# Patient Record
Sex: MALE | Race: ASIAN | NOT HISPANIC OR LATINO | Employment: STUDENT | URBAN - METROPOLITAN AREA
[De-identification: names, ages, dates, MRNs, and addresses within clinical notes are randomized per-mention and may not be internally consistent; named-entity substitution may affect disease eponyms.]

---

## 2022-08-12 ENCOUNTER — HOSPITAL ENCOUNTER (EMERGENCY)
Facility: CLINIC | Age: 27
Discharge: HOME | End: 2022-08-12
Attending: INTERNAL MEDICINE | Admitting: INTERNAL MEDICINE
Payer: COMMERCIAL

## 2022-08-12 VITALS
TEMPERATURE: 98.6 F | RESPIRATION RATE: 24 BRPM | DIASTOLIC BLOOD PRESSURE: 94 MMHG | SYSTOLIC BLOOD PRESSURE: 148 MMHG | OXYGEN SATURATION: 97 % | HEART RATE: 103 BPM | WEIGHT: 255.73 LBS

## 2022-08-12 DIAGNOSIS — R06.02 SOB (SHORTNESS OF BREATH): ICD-10-CM

## 2022-08-12 PROCEDURE — 250N000013 HC RX MED GY IP 250 OP 250 PS 637: Performed by: INTERNAL MEDICINE

## 2022-08-12 PROCEDURE — 99283 EMERGENCY DEPT VISIT LOW MDM: CPT | Performed by: INTERNAL MEDICINE

## 2022-08-12 RX ORDER — DIPHENHYDRAMINE HCL 25 MG
25 CAPSULE ORAL ONCE
Status: COMPLETED | OUTPATIENT
Start: 2022-08-12 | End: 2022-08-12

## 2022-08-12 RX ORDER — ALBUTEROL SULFATE 90 UG/1
2 AEROSOL, METERED RESPIRATORY (INHALATION) EVERY 6 HOURS PRN
Qty: 18 G | Refills: 0 | Status: SHIPPED | OUTPATIENT
Start: 2022-08-12

## 2022-08-12 RX ORDER — CETIRIZINE HYDROCHLORIDE 10 MG/1
10 TABLET ORAL DAILY
Qty: 30 TABLET | Refills: 0 | Status: SHIPPED | OUTPATIENT
Start: 2022-08-12

## 2022-08-12 RX ADMIN — DIPHENHYDRAMINE HYDROCHLORIDE 25 MG: 25 CAPSULE ORAL at 17:47

## 2022-08-12 ASSESSMENT — ENCOUNTER SYMPTOMS
VOMITING: 0
RHINORRHEA: 1
PALPITATIONS: 0
CHEST TIGHTNESS: 0
CHILLS: 0
ABDOMINAL PAIN: 0
NAUSEA: 0
BACK PAIN: 0
LIGHT-HEADEDNESS: 0
WHEEZING: 1
TROUBLE SWALLOWING: 0
SHORTNESS OF BREATH: 1
NUMBNESS: 0
ADENOPATHY: 0
DYSPHORIC MOOD: 0
VOICE CHANGE: 0
DIARRHEA: 0
DYSURIA: 0
WEAKNESS: 0
SORE THROAT: 0
NERVOUS/ANXIOUS: 1
COUGH: 1
FEVER: 0
HEADACHES: 0

## 2022-08-12 ASSESSMENT — ACTIVITIES OF DAILY LIVING (ADL): ADLS_ACUITY_SCORE: 33

## 2022-08-12 NOTE — ED TRIAGE NOTES
Pt not sure if he is having a allergic reaction or not. Pt's roommate made shrimp last night, pt did not eat it but worried about it. Per pt he is having shortness of breath and chest pressure but did state that he gets more anxious, pt did work out more last night and some of this started after that also. On arrival pt's VSS. Pt talking in full sentences without issues.       Triage Assessment     Row Name 08/12/22 [employer]       Triage Assessment (Adult)    Airway WDL WDL       Respiratory WDL    Respiratory WDL WDL       Skin Circulation/Temperature WDL    Skin Circulation/Temperature WDL WDL       Cardiac WDL    Cardiac WDL WDL       Peripheral/Neurovascular WDL    Peripheral Neurovascular WDL WDL       Cognitive/Neuro/Behavioral WDL    Cognitive/Neuro/Behavioral WDL WDL

## 2022-08-12 NOTE — ED PROVIDER NOTES
ED Provider Note  [facility]      History     Chief Complaint   Patient presents with     Hyperventilating     Shortness of Breath     Panic Attack     ANA Akbar is a 27 year old male who presents with shortness of breath. He developed wheezing, cough and shortness of breath last night. He believes he is allergic to seafood and has had mild allergic reactions to shrimp and crab in the past. His roommate cooked shrimp last night and again for lunch today. He again developed shortness of breath this afternoon. He continued to be short of breath while walking outside and became anxious which proceeded to hyperventilation. He has had wheezing in the past associated with bronchitis and to some degree with exercise. He denies other health problems. He has no history of heart disease or diabetes. He has had no recent travel or prolonged immobility. He has chronic nasal congestion and some rhinorrhea. He has no sore throat. He was coughing last night, but not today. He has no chest pain or palpitations. He has no leg swelling. He has no difficulty swallowing or swelling of his tongue or throat. He has no nausea, vomiting, abdominal pain, diarrhea dysuria, leg pain or swelling. He denies significant past medical history. He has been under some mild stress related to job searches, but does not feel this is out of control or overwhelming, or any different currently than in the past.    His dyspnea has resolved after a short time in the ED.      History reviewed. No pertinent past medical history.    History reviewed. No pertinent surgical history.    No family history on file.    Social History     Tobacco Use     Smoking status: Not on file     Smokeless tobacco: Not on file   Substance Use Topics     Alcohol use: Not on file          Review of Systems   Constitutional: Negative for chills and fever.   HENT: Positive for congestion and rhinorrhea. Negative for sore throat, trouble  swallowing and voice change.    Eyes: Negative for visual disturbance.   Respiratory: Positive for cough, shortness of breath and wheezing. Negative for chest tightness.    Cardiovascular: Negative for chest pain, palpitations and leg swelling.   Gastrointestinal: Negative for abdominal pain, diarrhea, nausea and vomiting.   Genitourinary: Negative for dysuria.   Musculoskeletal: Negative for back pain.   Skin: Negative for rash.   Neurological: Negative for weakness, light-headedness, numbness and headaches.   Hematological: Negative for adenopathy.   Psychiatric/Behavioral: Negative for dysphoric mood. The patient is nervous/anxious.          Physical Exam   BP: (!) 148/92  Pulse: 119  Temp: 98.3  F (36.8  C)  Resp: 24  Weight: 116 kg (255 lb 11.7 oz)  SpO2: 99 %  Physical Exam  Vitals and nursing note reviewed.   Constitutional:       General: He is not in acute distress.     Appearance: He is well-developed.   HENT:      Head: Normocephalic and atraumatic.      Mouth/Throat:      Mouth: Mucous membranes are moist.      Pharynx: No pharyngeal swelling or oropharyngeal exudate.      Tonsils: No tonsillar exudate or tonsillar abscesses.   Eyes:      Extraocular Movements: Extraocular movements intact.      Pupils: Pupils are equal, round, and reactive to light.   Neck:      Vascular: No JVD.      Trachea: No tracheal deviation.   Cardiovascular:      Rate and Rhythm: Normal rate and regular rhythm.      Heart sounds: No murmur heard.  Pulmonary:      Effort: Pulmonary effort is normal.      Breath sounds: Normal breath sounds. No stridor. No wheezing or rales.   Abdominal:      Tenderness: There is no abdominal tenderness. There is no guarding.   Musculoskeletal:      Cervical back: Normal range of motion and neck supple.      Right lower leg: No edema.      Left lower leg: No edema.   Skin:     General: Skin is warm and dry.      Findings: No rash.   Neurological:      General: No focal deficit present.       Mental Status: He is alert and oriented to person, place, and time.      Cranial Nerves: No cranial nerve deficit.      Motor: No weakness.   Psychiatric:         Mood and Affect: Mood normal.         Behavior: Behavior normal.         ED Course      Procedures               No results found for any visits on 08/12/22.  Medications   diphenhydrAMINE (BENADRYL) capsule 25 mg (has no administration in time range)        Assessments & Plan (with Medical Decision Making)   Impression:  Young male presents with dyspnea and anxiety and hyperventilation on arrival in the ED. Symptoms quickly resolved with rest. He has been having dyspnea, wheezing and cough since exposure to vapors from shrimp being cooked at home. 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 He has had past wheezing with respiratory infections and with exertion. He has no symptoms of DVT and no past history or current risk factors. He has no rash, or orofacial swelling on exam. He has some rhinitis symptoms. He has had no known Covid exposures. He was mildly tachycardic on arrival, but heart rate quickly normalized with rest. He has no risk factors for coronary heart disease. He has been under some stress with job searches, but denies significant anxiety or depression. Symptoms could represent first panic attack, but have resolved without treatment.  It is most likely that he has mild intermittent/ exercise induced wheezing which has been complicated by mild allergic response to seafood being cooked in his residence. He would prefer to avoid extensive medical work up today. As symptoms have rapidly cleared and have a likely explanation, I think it would be reasonable to try antihistamines and PRN Albuterol. I have asked him to return for full evaluation if symptoms persist, worsen or do not respond promptly to the prescribed treatment.    I have reviewed the nursing notes. I have reviewed the findings, diagnosis, plan and need for follow up with the  patient.    New Prescriptions    ALBUTEROL (PROAIR HFA/PROVENTIL HFA/VENTOLIN HFA) 108 (90 BASE) MCG/ACT INHALER    Inhale 2 puffs into the lungs every 6 hours as needed for shortness of breath / dyspnea or wheezing    CETIRIZINE (ZYRTEC) 10 MG TABLET    Take 1 tablet (10 mg) by mouth daily       Final diagnoses:   SOB (shortness of breath)       --  Rodríguez Velazquez  [facility] EMERGENCY DEPARTMENT  8/12/2022     Rodríguez Velazquez MD  08/12/22 [id number]

## 2022-08-12 NOTE — ED NOTES
Pt reporting anterior chest pressure and mid back, does not want ECG done because he is not sure if his insurance is going to cover it and he is feeling better than before.

## 2022-08-12 NOTE — DISCHARGE INSTRUCTIONS
Zyrtec (cetirizine) (antihistamine) 10 mg daily.  Albuterol 2 puffs inhaled every 4-6 hours as needed for wheezing.  Return for fever, cough, chest pain, worsening symptoms. Return if you develop symptoms that do not resolve with use of the inhaler.  Follow up [facility] [phone] or [facility] [phone] or the clinic of your choice.